# Patient Record
Sex: MALE | Race: AMERICAN INDIAN OR ALASKA NATIVE
[De-identification: names, ages, dates, MRNs, and addresses within clinical notes are randomized per-mention and may not be internally consistent; named-entity substitution may affect disease eponyms.]

---

## 2021-01-31 ENCOUNTER — HOSPITAL ENCOUNTER (EMERGENCY)
Dept: HOSPITAL 5 - ED | Age: 41
Discharge: HOME | End: 2021-01-31
Payer: SELF-PAY

## 2021-01-31 VITALS — SYSTOLIC BLOOD PRESSURE: 138 MMHG | DIASTOLIC BLOOD PRESSURE: 101 MMHG
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
## 2021-01-31 DIAGNOSIS — S39.012A: Primary | ICD-10-CM

## 2021-01-31 DIAGNOSIS — F17.200: ICD-10-CM
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
## 2021-01-31 DIAGNOSIS — Z88.0: ICD-10-CM

## 2021-01-31 DIAGNOSIS — F12.90: ICD-10-CM

## 2021-01-31 DIAGNOSIS — Z79.899: ICD-10-CM
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
## 2021-01-31 DIAGNOSIS — M54.32: ICD-10-CM
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
## 2021-01-31 PROCEDURE — 96372 THER/PROPH/DIAG INJ SC/IM: CPT

## 2021-01-31 PROCEDURE — 99282 EMERGENCY DEPT VISIT SF MDM: CPT

## 2021-01-31 NOTE — EMERGENCY DEPARTMENT REPORT
ED Back Pain/Injury HPI





- General


Chief Complaint: Back Pain/Injury


Stated Complaint: MUSCLE SPASMS


Time Seen by Provider: 01/31/21 17:13


Source: patient


Limitations: No Limitations





- History of Present Illness


Initial Comments: 





This is a 40-year-old male nontoxic, well nourished in appearance, no acute 

signs of distress presents to the ED with c/o of acute on chronic lower back 

pain.  Patient stated that the past 2 days he was moving and developed this 

pain.  Patient states has history of sciatica nerve pain which is similar 

symptoms as today.  Patient states that pain radiates through to his left lower 

extremity.  Patient denies any trauma.  Denies any bladder or bowel instability.

 Patient denies any urinary symptoms.  Denies any fever, chills, nausea, 

vomiting, headache, stiff neck, chest pain or shortness of breath.  Patient 

denies any numbness or tingling.  Patient stated allergies to penicillin.  

Denies significant past medical history.


MD Complaint: back pain


-: days(s)


Similar Symptoms Previously: Yes


Radiation: left leg


Severity: mild


Severity scale (0 -10): 8


Quality: aching


Consistency: intermittent


Improves With: immobilization, sitting upright


Worsens With: movement, walking


Context: turning/twisting


Associated Symptoms: denies other symptoms.  denies: confusion, weakness, chest 

pain, numbness, difficulty walking, cough, difficulty urinating, diaphoresis, 

incontinence, fever/chills, constipation, headaches, abdominal pain, loss of 

appetite, malaise, nausea/vomiting, rash, seizure, shortness of breath, syncope





- Related Data


                                  Previous Rx's











 Medication  Instructions  Recorded  Last Taken  Type


 


Cyclobenzaprine [Flexeril] 10 mg PO QHS PRN #10 tablet 01/31/21 Unknown Rx


 


Naproxen 500 mg PO Q12H PRN #12 tablet 01/31/21 Unknown Rx











                                    Allergies











Allergy/AdvReac Type Severity Reaction Status Date / Time


 


Penicillins Allergy  Angioedema Verified 01/31/21 17:51














ED Review of Systems


ROS: 


Stated complaint: MUSCLE SPASMS


Other details as noted in HPI





Constitutional: denies: chills, fever


Eyes: denies: eye pain, eye discharge, vision change


ENT: denies: ear pain, throat pain


Respiratory: denies: cough, shortness of breath, wheezing


Cardiovascular: denies: chest pain, palpitations


Endocrine: no symptoms reported


Gastrointestinal: denies: abdominal pain, nausea, diarrhea


Genitourinary: denies: urgency, dysuria


Musculoskeletal: back pain.  denies: joint swelling, arthralgia


Skin: denies: rash, lesions


Neurological: denies: headache, weakness, paresthesias


Psychiatric: denies: anxiety, depression


Hematological/Lymphatic: denies: easy bleeding, easy bruising





ED Past Medical Hx





- Past Medical History


Previous Medical History?: Yes


Additional medical history: Back pain and back muscle spasms





- Surgical History


Past Surgical History?: No





- Social History


Smoking Status: Current Every Day Smoker


Substance Use Type: Alcohol, Marijuana





- Medications


Home Medications: 


                                Home Medications











 Medication  Instructions  Recorded  Confirmed  Last Taken  Type


 


Cyclobenzaprine [Flexeril] 10 mg PO QHS PRN #10 tablet 01/31/21  Unknown Rx


 


Naproxen 500 mg PO Q12H PRN #12 tablet 01/31/21  Unknown Rx














ED Physical Exam





- General


Limitations: No Limitations


General appearance: alert, in no apparent distress





- Head


Head exam: Present: atraumatic, normocephalic





- Eye


Eye exam: Present: normal appearance





- Neck


Neck exam: Present: normal inspection, full ROM





- Respiratory


Respiratory exam: Absent: respiratory distress





- Cardiovascular


Cardiovascular Exam: Present: regular rate





- GI/Abdominal


GI/Abdominal exam: Present: soft.  Absent: distended, tenderness





- Extremities Exam


Extremities exam: Present: normal inspection, full ROM, normal capillary refill.

  Absent: tenderness, calf tenderness





- Back Exam


Back exam: Present: normal inspection, full ROM, paraspinal tenderness (left 

lumbar paraspinal).  Absent: tenderness, CVA tenderness (R), CVA tenderness (L),

 muscle spasm, vertebral tenderness, rash noted





- Expanded Back Exam


  ** Expanded


Back exam: Absent: saddle anesthesia


Back exam: Negative Straight Leg Raising: Left, Right





- Neurological Exam


Neurological exam: Present: alert, oriented X3, normal gait





- Psychiatric


Psychiatric exam: Present: normal affect, normal mood





- Skin


Skin exam: Present: warm, dry, intact, normal color.  Absent: rash





ED Course


                                   Vital Signs











  01/31/21





  17:12


 


Temperature 98.7 F


 


Pulse Rate 79


 


Respiratory 18





Rate 


 


Blood Pressure 138/101


 


O2 Sat by Pulse 94





Oximetry 














- Reevaluation(s)


Reevaluation #1: 





01/31/21 18:03


Patient is speaking in full sentences with no signs of distress noted.





ED Medical Decision Making





- Medical Decision Making





This is a 40-year-old male that presents with low back strain.  Patient is 

stable was examined by me.  There is no spinal tenderness.  There is no cauda 

equina syndrome during examination.  No bladder or bowel instability. Patient 

received Toradol 60 mg IM and prednisone in the ED which stated that his 

symptoms has resolved and subsided.  Patient is discharged with muscle relaxant 

and Motrin.  Patient was instructed not to operate any machinery while taking 

muscle relaxant as they cause her drowsiness.  Patient was referred to Follow-up

 with a primary care doctor in 3-5 days or if symptoms worsen and continue 

return to emergency room as soon as possible.  At time of discharge, the patient

 does not seem toxic or ill in appearance.  No acute signs of distress noted.  

Patient agrees to discharge treatment plan of care.  No further questions noted 

by the patient.





This chart is dictated with using Dragon Dictation Program


Critical care attestation.: 


If time is entered above; I have spent that time in minutes in the direct care 

of this critically ill patient, excluding procedure time.








ED Disposition


Clinical Impression: 


Low back strain


Qualifiers:


 Encounter type: initial encounter Qualified Code(s): S39.012A - Strain of 

muscle, fascia and tendon of lower back, initial encounter





Sciatica


Qualifiers:


 Laterality: left Qualified Code(s): M54.32 - Sciatica, left side





Disposition: DC-01 TO HOME OR SELFCARE


Is pt being admited?: No


Does the pt Need Aspirin: No


Condition: Stable


Instructions:  Sciatica, Lumbosacral Strain


Additional Instructions: 


Follow-up with your primary care doctor in 3-5 days or if symptoms worsen such 

as bladder or bowel stability, chest pain, short of breath, numbness or tingling

 sensation in extremities, headache, dizziness, visual changes, nausea vomiting,

 or abdominal pain, return back to emergency room as was possible.





Take naproxen and Flexeril as prescribed.  Do not operate heavy machinery while 

taking Flexeril due to sedation


Prescriptions: 


Cyclobenzaprine [Flexeril] 10 mg PO QHS PRN #10 tablet


 PRN Reason: Muscle Spasm


Naproxen 500 mg PO Q12H PRN #12 tablet


 PRN Reason: Pain , Severe (7-10)


Referrals: 


ANKIT VELÁSQUEZ MD [Primary Care Provider] - 3-5 Days


ALEX MENDOZA MD [Staff Physician] - 3-5 Days


Forms:  Work/School Release Form(ED)


Time of Disposition: 18:05

## 2022-01-02 ENCOUNTER — HOSPITAL ENCOUNTER (EMERGENCY)
Dept: HOSPITAL 5 - ED | Age: 42
Discharge: HOME | End: 2022-01-02
Payer: SELF-PAY

## 2022-01-02 VITALS — DIASTOLIC BLOOD PRESSURE: 98 MMHG | SYSTOLIC BLOOD PRESSURE: 148 MMHG

## 2022-01-02 DIAGNOSIS — Z88.0: ICD-10-CM

## 2022-01-02 DIAGNOSIS — J12.82: ICD-10-CM

## 2022-01-02 DIAGNOSIS — Z79.899: ICD-10-CM

## 2022-01-02 DIAGNOSIS — U07.1: Primary | ICD-10-CM

## 2022-01-02 LAB
ALBUMIN SERPL-MCNC: 4.3 G/DL (ref 3.9–5)
ALT SERPL-CCNC: 24 UNITS/L (ref 7–56)
BASOPHILS # (AUTO): 0 K/MM3 (ref 0–0.1)
BASOPHILS NFR BLD AUTO: 0.1 % (ref 0–1.8)
BILIRUB UR QL STRIP: (no result)
BLOOD UR QL VISUAL: (no result)
BUN SERPL-MCNC: 11 MG/DL (ref 9–20)
BUN/CREAT SERPL: 14 %
CALCIUM SERPL-MCNC: 9.6 MG/DL (ref 8.4–10.2)
EOSINOPHIL # BLD AUTO: 0 K/MM3 (ref 0–0.4)
EOSINOPHIL NFR BLD AUTO: 0.5 % (ref 0–4.3)
HCT VFR BLD CALC: 46.9 % (ref 35.5–45.6)
HEMOLYSIS INDEX: 1
HGB BLD-MCNC: 15.1 GM/DL (ref 11.8–15.2)
LYMPHOCYTES # BLD AUTO: 1.8 K/MM3 (ref 1.2–5.4)
LYMPHOCYTES NFR BLD AUTO: 19.1 % (ref 13.4–35)
MCHC RBC AUTO-ENTMCNC: 32 % (ref 32–34)
MCV RBC AUTO: 87 FL (ref 84–94)
MONOCYTES # (AUTO): 1.2 K/MM3 (ref 0–0.8)
MONOCYTES % (AUTO): 12.7 % (ref 0–7.3)
MUCOUS THREADS #/AREA URNS HPF: (no result) /HPF
PH UR STRIP: 5 [PH] (ref 5–7)
PLATELET # BLD: 284 K/MM3 (ref 140–440)
RBC # BLD AUTO: 5.42 M/MM3 (ref 3.65–5.03)
RBC #/AREA URNS HPF: 1 /HPF (ref 0–6)
UROBILINOGEN UR-MCNC: 4 MG/DL (ref ?–2)
WBC #/AREA URNS HPF: 3 /HPF (ref 0–6)

## 2022-01-02 PROCEDURE — 80053 COMPREHEN METABOLIC PANEL: CPT

## 2022-01-02 PROCEDURE — 81001 URINALYSIS AUTO W/SCOPE: CPT

## 2022-01-02 PROCEDURE — 36415 COLL VENOUS BLD VENIPUNCTURE: CPT

## 2022-01-02 PROCEDURE — 71046 X-RAY EXAM CHEST 2 VIEWS: CPT

## 2022-01-02 PROCEDURE — 85025 COMPLETE CBC W/AUTO DIFF WBC: CPT

## 2022-01-02 PROCEDURE — 99283 EMERGENCY DEPT VISIT LOW MDM: CPT

## 2022-01-02 NOTE — XRAY REPORT
CHEST 2 VIEWS 



INDICATION / CLINICAL INFORMATION:

fever/chills/+covid.



COMPARISON: 

None available.



FINDINGS:



SUPPORT DEVICES: None.



HEART / MEDIASTINUM: No significant abnormality. 



LUNGS / PLEURA: There are mild patchy bilateral opacities. 



ADDITIONAL FINDINGS: No significant additional findings.



IMPRESSION:

1. Mild patchy bilateral opacities likely indicating multifocal pneumonia.



Signer Name: Fawad Kwong MD 

Signed: 1/2/2022 12:10 PM

Workstation Name: [id number]

## 2022-01-02 NOTE — EMERGENCY DEPARTMENT REPORT
ED General Adult HPI





- General


Chief complaint: Fever


Stated complaint: HEADACHE, SWEATS


Time Seen by Provider: 22 10:51


Source: patient


Mode of arrival: Ambulatory


Limitations: No Limitations





- History of Present Illness


Initial comments: 





41-year-old male presents to the ER today with complaints of headache, night 

sweats and chills.  Patient states that he started for fever about 2 weeks ago 

and he went and took a Covid test about 2 days ago and it was positive.  He 

states that he has not been having fever but he has noticed that he still been 

having chills, headache and sweats mainly when he lays down at night.  He states

that he was having body aches but this has since resolved.  He also reports that

he noticed that his urine is dark but denies any dysuria, hematuria or urinary 

urgency or frequency.  He denies any rhinorrhea, nasal congestion cough, 

shortness of breath or wheezing.  He denies any recent head injury.  He denies 

any vomiting or diarrhea.  He states that he did not get the COVID-19 vaccine.  

He


MD Complaint: Chills, headache, fever, diaphoresis, Covid positive


-: days(s)





- Related Data


                                  Previous Rx's











 Medication  Instructions  Recorded  Last Taken  Type


 


Cyclobenzaprine [Flexeril] 10 mg PO QHS PRN #10 tablet 21 Unknown Rx


 


Naproxen 500 mg PO Q12H PRN #12 tablet 21 Unknown Rx


 


Albuterol Mdi (or & Nicu Only) 2 puff IH QID PRN #8.5 gram 22 Unknown Rx





[ProAir HFA Inhaler]    


 


Azithromycin [Zithromax Z-ALVARADO] 250 mg PO DAILY #1 pack 22 Unknown Rx


 


methylPREDNISolone [Medrol 4MG 4 mg PO DAILY #1 tab.ds.pk 22 Unknown Rx





DOSEPAK (21 tabs)]    











                                    Allergies











Allergy/AdvReac Type Severity Reaction Status Date / Time


 


Penicillins Allergy  Angioedema Verified 21 17:51














ED Review of Systems


ROS: 


Stated complaint: HEADACHE, SWEATS


Other details as noted in HPI





Comment: All other systems reviewed and negative


Constitutional: chills, diaphoresis, fever


Eyes: denies: eye pain, eye discharge, vision change


ENT: denies: ear pain, throat pain, dental pain, hearing loss, epistaxis, 

congestion


Respiratory: denies: cough, orthopnea, shortness of breath, SOB with exertion, 

SOB at rest, wheezing


Cardiovascular: denies: chest pain, palpitations, dyspnea on exertion, edema, 

syncope, paroxysmal nocturnal dyspnea


Gastrointestinal: denies: abdominal pain, nausea, diarrhea, constipation, 

hematemesis, hematochezia


Genitourinary: denies: urgency, dysuria, frequency, hematuria, discharge


Musculoskeletal: myalgia


Neurological: headache.  denies: numbness, paresthesias, confusion, abnormal 

gait, vertigo


Psychiatric: denies: anxiety, depression, auditory hallucinations, visual 

hallucinations, homicidal thoughts


Hematological/Lymphatic: denies: easy bleeding, easy bruising





ED Past Medical Hx





- Past Medical History


Additional medical history: Back pain and back muscle spasms





- Social History


Smoking Status: Current Every Day Smoker


Substance Use Type: Alcohol, Marijuana





- Medications


Home Medications: 


                                Home Medications











 Medication  Instructions  Recorded  Confirmed  Last Taken  Type


 


Cyclobenzaprine [Flexeril] 10 mg PO QHS PRN #10 tablet 21  Unknown Rx


 


Naproxen 500 mg PO Q12H PRN #12 tablet 21  Unknown Rx


 


Albuterol Mdi (or & Nicu Only) 2 puff IH QID PRN #8.5 gram 22  Unknown Rx





[ProAir HFA Inhaler]     


 


Azithromycin [Zithromax Z-ALVARADO] 250 mg PO DAILY #1 pack 22  Unknown Rx


 


methylPREDNISolone [Medrol 4MG 4 mg PO DAILY #1 tab.ds.pk 22  Unknown Rx





DOSEPAK (21 tabs)]     














ED Physical Exam





- General


Limitations: No Limitations


General appearance: alert, in no apparent distress





- Head


Head exam: Present: atraumatic, normocephalic, normal inspection





- Eye


Eye exam: Present: normal appearance, PERRL, EOMI


Pupils: Present: normal accommodation





- ENT


ENT exam: Present: normal exam, mucous membranes moist





- Neck


Neck exam: Present: normal inspection, full ROM.  Absent: meningismus





- Respiratory


Respiratory exam: Present: normal lung sounds bilaterally.  Absent: respiratory 

distress, wheezes, rales, rhonchi





- Cardiovascular


Cardiovascular Exam: Present: regular rate, normal rhythm, normal heart sounds





- GI/Abdominal


GI/Abdominal exam: Present: soft.  Absent: distended, tenderness, guarding, 

rebound





- Neurological Exam


Neurological exam: Present: alert, oriented X3, CN II-XII intact, normal gait





- Psychiatric


Psychiatric exam: Present: normal affect, normal mood





- Skin


Skin exam: Present: intact





ED Course


                                   Vital Signs











  22





  07:31


 


Temperature 99.2 F


 


Pulse Rate 93 H


 


Respiratory 15





Rate 


 


Blood Pressure 141/98


 


O2 Sat by Pulse 99





Oximetry 














ED Medical Decision Making





- Lab Data


Result diagrams: 


                                 22 11:36





                                 22 11:36





- Radiology Data


Radiology results: report reviewed


Patient: RAMÓN PIRES                                                  

             MR#: SIMEON  


[id number]          


: 1980                                                                

Acct:[id number]      


 


Age/Sex: 41 / M                                                                

ADM Date: 22     


 


Loc: ED       


Attending Dr:   


 


 


Ordering Physician: SARITA LUONG  


Date of Service: 22  


Procedure(s): XR chest routine 2V  


Accession Number(s): [id number]  


 


cc: SARITA LUONG   


 


Fluoro Time In Minutes:   


 


CHEST 2 VIEWS   


 


 INDICATION / CLINICAL INFORMATION:  


 fever/chills/+covid.  


 


 COMPARISON:   


 None available.  


 


 FINDINGS:  


 


 SUPPORT DEVICES: None.  


 


 HEART / MEDIASTINUM: No significant abnormality.   


 


 LUNGS / PLEURA: There are mild patchy bilateral opacities.   


 


 ADDITIONAL FINDINGS: No significant additional findings.  


 


 IMPRESSION:  


 1. Mild patchy bilateral opacities likely indicating multifocal pneumonia.  


 


 Signer Name: Fawad Kwong MD   


 Signed: 2022 12:10 PM  


 Workstation Name: [id number]   


 


 


Transcribed By: JAI  


Dictated By: Fawad Kwong MD  


Electronically Authenticated By: Fawad Kwong MD    


Signed Date/Time: 22 1210                                


 


 


 


DD/DT: 22 1210                                                            

 


TD/TT:











- Medical Decision Making


1334: Labs reviewed and unremarkable.  Chest x-ray shows mild Mild patchy 

bilateral opacities likely indicating multifocal pneumonia.  


Patient was ambulated by me in the ER, his O2 sat maintained at 96% on room air.

 He had no complaints of chest pain or shortness of breath with ambulation.  He 

ambulated well without any difficulty.  Patient overall is not toxic, is not 

ill-appearing, is not in any significant distress.  Is awake alert and oriented 

x3 and is neurologically intact.  He has no meningeal signs on exam. He appears 

well-hydrated.  At this time there is no indication for any additional testing 

or admission.  Discussed lab results as well as chest x-ray results with 

patient.  He will be discharged home with a prescription for Zithromax to cover 

for any associated bacterial pneumonia, as well as an albuterol inhaler.  

Recommended patient to drink lots of fluids, continue to quarantine, and follow-

up with his PCP.  Patient understands to return to the ER if his symptoms wo

rsens in any way.


Critical care attestation.: 


If time is entered above; I have spent that time in minutes in the direct care 

of this critically ill patient, excluding procedure time.








ED Disposition


Clinical Impression: 


 Pneumonia due to COVID-19 virus





Disposition: 01 HOME / SELF CARE / HOMELESS


Is pt being admited?: No


Does the pt Need Aspirin: No


Condition: Stable


Instructions:  COVID-19, COVID-19: How to Protect Yourself and Others - CDC, 

Prevent the Spread of COVID-19 if You Are Sick - CDC, Bacterial Pneumonia (ED)


Additional Instructions: 


I recommend that you take the Medrol Dosepak as prescribed, as well as the 

Zithromax, and using albuterol inhaler as discussed.  I do recommend that you 

continue to quarantine at home.  You can take Tylenol and/or ibuprofen for any 

pain or fever.  I do recommend that she keep a thermometer at the house to 

monitor for fever.  Also recommend that you purchase a pulse oximeter from 

over-the-counter to monitor your oxygen levels at home.  If your oxygen level is

persistently less than 92 /93% with worsening symptoms return to the ER.  

Otherwise follow-up closely with your PCP.


Prescriptions: 


methylPREDNISolone [Medrol 4MG DOSEPAK (21 tabs)] 4 mg PO DAILY #1 tab.ds.pk


Albuterol Mdi (or & Nicu Only) [ProAir HFA Inhaler] 2 puff IH QID PRN #8.5 gram


 PRN Reason: Shortness Of Breath


Azithromycin [Zithromax Z-ALVARADO] 250 mg PO DAILY #1 pack


Referrals: 


PRIMARY CARE,MD [Primary Care Provider] - 3-5 Days


Time of Disposition: 13:37